# Patient Record
(demographics unavailable — no encounter records)

---

## 2024-10-24 NOTE — HISTORY OF PRESENT ILLNESS
[de-identified] : The patient comes in today with complaints referable to his right shoulder.  He had injections in the past.

## 2024-10-24 NOTE — HISTORY OF PRESENT ILLNESS
[de-identified] : The patient comes in today with complaints referable to his right shoulder.  He had injections in the past.

## 2024-10-24 NOTE — PROCEDURE
[de-identified] : Indication: AC joint arthritis right shoulder   Consent: At this time, I have recommended an injection to the right shoulder.  The risks and benefits of the procedure were discussed with the patient in detail.  Upon verbal consent of the patient, we proceeded with the injection as noted below.   Description of Procedure: After a sterile prep, the patient underwent an injection of approximately 9 mL of 1% Lidocaine (10 mg/mL) without epinephrine and 1 mL of triamcinolone acetonide (40 mg/mL) into the right shoulder.  The patient tolerated the procedure well.  There were no complications.   :  [employer] Drug Name:  Triamcinolone Acetonide Injectable Suspension USP NCD#:  [address]-0 Lot#:  [id number] Expiration Date:  08/31/2025

## 2024-10-24 NOTE — DISCUSSION/SUMMARY
[de-identified] : The patient presents with AC joint arthritis of the right shoulder.  At this time, he was given a cortisone injection.  I recommend ice, elevation and reassessment in two months.

## 2024-10-24 NOTE — PROCEDURE
[de-identified] : Indication: AC joint arthritis right shoulder   Consent: At this time, I have recommended an injection to the right shoulder.  The risks and benefits of the procedure were discussed with the patient in detail.  Upon verbal consent of the patient, we proceeded with the injection as noted below.   Description of Procedure: After a sterile prep, the patient underwent an injection of approximately 9 mL of 1% Lidocaine (10 mg/mL) without epinephrine and 1 mL of triamcinolone acetonide (40 mg/mL) into the right shoulder.  The patient tolerated the procedure well.  There were no complications.   :  [employer] Drug Name:  Triamcinolone Acetonide Injectable Suspension USP NCD#:  [address]-7 Lot#:  [id number] Expiration Date:  08/31/2025

## 2024-10-24 NOTE — ADDENDUM
[FreeTextEntry1] : This note was written by Yesenia Santoro on 10/24/2024 acting as scribe for Michael Gibson III, MD

## 2024-10-24 NOTE — PHYSICAL EXAM
[Normal] : Gait: normal [de-identified] : Right Shoulder: Shoulder: Range of Motion in Degrees:                                         Claimant: Normal:  Abduction (Active)                   180                180 degrees  Abduction (Passive)   180                180 degrees  Forward elevation (Active):   180                180 degrees  Forward elevation (Passive):   180                180 degrees  External rotation (Active):    45                45 degrees  External rotation (Passive):    45                45 degrees  Internal rotation (Active):    L-1                L-1  Internal rotation (Passive):    L-1                L-1    No motor weakness to internal rotation, external rotation or abduction in the scapular plane.  Negative crank test.  Negative Warfield's test.  Negative Speeds test. Negative Yergason's test.  Positive cross arm test.  Positive tenderness to palpation over the AC joint. Negative Kang sign.  Negative Neer's sign.  Negative apprehension. Negative sulcus sign.  No gross neurological or vascular deficits distally.  Skin is intact.  No rashes, scars or lesions. 2+ radial and ulnar pulses. No extra-articular swelling or tenderness.   [de-identified] : Station:  Normal. [de-identified] : Appearance:  Well-developed, well-nourished male in no acute distress.   [de-identified] : Radiographs, two views of the right shoulder taken in the office today, show no interval change.

## 2024-10-24 NOTE — DISCUSSION/SUMMARY
[de-identified] : The patient presents with AC joint arthritis of the right shoulder.  At this time, he was given a cortisone injection.  I recommend ice, elevation and reassessment in two months.

## 2024-10-24 NOTE — PHYSICAL EXAM
[Normal] : Gait: normal [de-identified] : Right Shoulder: Shoulder: Range of Motion in Degrees:                                         Claimant: Normal:  Abduction (Active)                   180                180 degrees  Abduction (Passive)   180                180 degrees  Forward elevation (Active):   180                180 degrees  Forward elevation (Passive):   180                180 degrees  External rotation (Active):    45                45 degrees  External rotation (Passive):    45                45 degrees  Internal rotation (Active):    L-1                L-1  Internal rotation (Passive):    L-1                L-1    No motor weakness to internal rotation, external rotation or abduction in the scapular plane.  Negative crank test.  Negative Marion's test.  Negative Speeds test. Negative Yergason's test.  Positive cross arm test.  Positive tenderness to palpation over the AC joint. Negative Kang sign.  Negative Neer's sign.  Negative apprehension. Negative sulcus sign.  No gross neurological or vascular deficits distally.  Skin is intact.  No rashes, scars or lesions. 2+ radial and ulnar pulses. No extra-articular swelling or tenderness.   [de-identified] : Station:  Normal. [de-identified] : Appearance:  Well-developed, well-nourished male in no acute distress.   [de-identified] : Radiographs, two views of the right shoulder taken in the office today, show no interval change.

## 2025-03-17 NOTE — DISCUSSION/SUMMARY
[de-identified] : At this time, due to AC arthritis of the right shoulder, we are going to order an MRI just to rule out any other pathology.

## 2025-03-17 NOTE — PHYSICAL EXAM
[Normal] : Gait: normal [de-identified] : Right Shoulder:  Shoulder: Range of Motion in Degrees:    	 	                                  Claimant:	Normal:	 Abduction (Active)	                  180	               180 degrees	 Abduction (Passive)	          180	               180 degrees	 Forward elevation (Active):	  180	               180 degrees	 Forward elevation (Passive):	  180	               180 degrees	 External rotation (Active):	   45	                      45 degrees	 External rotation (Passive):	   45	                      45 degrees	 Internal rotation (Active):	           L-1	               L-1	 Internal rotation (Passive):	   L-1	               L-1	  No motor weakness to internal rotation, external rotation or abduction in the scapular plane.  Negative crank test.  Negative Buffalo's test.  Negative Speeds test. Negative Yergason's test.  Positive cross arm test.  Positive tenderness to palpation over the AC joint. Negative Kang sign.  Negative Neers sign.  Negative apprehension. Negative sulcus sign.  No gross neurological or vascular deficits distally.  Skin is intact.  No rashes, scars or lesions. 2+ radial and ulnar pulses. No extra-articular swelling or tenderness.    [de-identified] : Appearance:  Well-developed, well-nourished male in no acute distress.

## 2025-03-17 NOTE — HISTORY OF PRESENT ILLNESS
[de-identified] : The patient comes in today with right shoulder pain.  He states he has had two cortisone injections already from Dr. Gibson, and he still having pain in his AC joint.

## 2025-03-17 NOTE — ADDENDUM
[FreeTextEntry1] : This note was written by Jadon Pierce on 03/17/2025, acting as a scribe for ALKA BAILEY, BAYLEE/L, PA

## 2025-04-16 NOTE — HISTORY OF PRESENT ILLNESS
[de-identified] : The patient comes in for review of right shoulder MRI, which is noted below.  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  He states he banged the shovel into the ground and he had pain after he did that.

## 2025-04-16 NOTE — PHYSICAL EXAM
[Normal] : Gait: normal [de-identified] : Right Clavicle:  Range of motion not assessed secondary to fracture.  He has pain right over the lateral part of the clavicle.  [de-identified] : Appearance:  Well-developed, well-nourished male in no acute distress.   [de-identified] : Review of MRI of the right shoulder reveals a significant stress fracture in the AC joint, mostly the lateral part of the clavicle.  There is subarticular edema along the lateral clavicle, related to distal clavicular osteolysis or stress reaction.  He has pain right over that area.

## 2025-04-16 NOTE — DISCUSSION/SUMMARY
[de-identified] : At this time, due to stress fracture of right lateral clavicle, he was advised to stay out of work, rest, no lifting, no pulling or pushing anything heavy, no stress to the right upper extremity, and return back to the office in 3 weeks.

## 2025-04-16 NOTE — HISTORY OF PRESENT ILLNESS
[de-identified] : The patient comes in for review of right shoulder MRI, which is noted below.  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  He states he banged the shovel into the ground and he had pain after he did that.

## 2025-04-16 NOTE — DISCUSSION/SUMMARY
[de-identified] : At this time, due to stress fracture of right lateral clavicle, he was advised to stay out of work, rest, no lifting, no pulling or pushing anything heavy, no stress to the right upper extremity, and return back to the office in 3 weeks.

## 2025-04-16 NOTE — ADDENDUM
[FreeTextEntry1] : This note was written by Jadon Pierce on 04/15/2025, acting as a scribe for ALKA BAILEY, BAYLEE/L, PA

## 2025-04-16 NOTE — PHYSICAL EXAM
[Normal] : Gait: normal [de-identified] : Right Clavicle:  Range of motion not assessed secondary to fracture.  He has pain right over the lateral part of the clavicle.  [de-identified] : Appearance:  Well-developed, well-nourished male in no acute distress.   [de-identified] : Review of MRI of the right shoulder reveals a significant stress fracture in the AC joint, mostly the lateral part of the clavicle.  There is subarticular edema along the lateral clavicle, related to distal clavicular osteolysis or stress reaction.  He has pain right over that area.

## 2025-05-06 NOTE — HISTORY OF PRESENT ILLNESS
[de-identified] : The patient comes in today with a stress reaction to his right clavicle.  He states he is feeling better at this time. 
Statement Selected

## 2025-05-06 NOTE — DISCUSSION/SUMMARY
[de-identified] : At this time, due to stress reaction to the right clavicle, the patient was advised to do some strengthening and return to the office in 6-8 weeks.  He is going to do PT.

## 2025-05-06 NOTE — PHYSICAL EXAM
[Normal] : Gait: normal [de-identified] :    Right Shoulder/Clavicle Shoulder: Range of Motion in Degrees:                                                   Claimant:          Normal:   Abduction (Active)                  180                  180 degrees   Abduction (Passive)               180                  180 degrees   Forward elevation (Active):    180                  180 degrees   Forward elevation (Passive):  180                 180 degrees   External rotation (Active):       45                   45 degrees   External rotation (Passive):    45                   45 degrees   Internal rotation (Active):        L-1                  L-1   Internal rotation (Passive):     L-1                  L-1   He does have good strength, but there is slightly decreased strength in the right upper extremity compared to the left upper extremity.  Neurovascular and neurologic examinations within normal limits.    [de-identified] : Appearance:  Well-developed, well-nourished male in no acute distress.   [de-identified] : Radiographs, two views of the right shoulder taken in the office today, reveal adequate alignment.  No new fractures.

## 2025-05-06 NOTE — ADDENDUM
[FreeTextEntry1] : This note was written by Jadon Pierce on 05/06/2025, acting as a scribe for ALKA BAILEY, BAYLEE/L, PA